# Patient Record
Sex: MALE | Race: WHITE | NOT HISPANIC OR LATINO | Employment: FULL TIME | URBAN - METROPOLITAN AREA
[De-identification: names, ages, dates, MRNs, and addresses within clinical notes are randomized per-mention and may not be internally consistent; named-entity substitution may affect disease eponyms.]

---

## 2019-04-06 ENCOUNTER — APPOINTMENT (EMERGENCY)
Dept: RADIOLOGY | Facility: HOSPITAL | Age: 24
End: 2019-04-06
Payer: COMMERCIAL

## 2019-04-06 ENCOUNTER — HOSPITAL ENCOUNTER (EMERGENCY)
Facility: HOSPITAL | Age: 24
Discharge: HOME | End: 2019-04-06
Attending: EMERGENCY MEDICINE | Admitting: EMERGENCY MEDICINE
Payer: COMMERCIAL

## 2019-04-06 VITALS
RESPIRATION RATE: 18 BRPM | WEIGHT: 145 LBS | SYSTOLIC BLOOD PRESSURE: 103 MMHG | HEART RATE: 78 BPM | TEMPERATURE: 98.6 F | OXYGEN SATURATION: 99 % | DIASTOLIC BLOOD PRESSURE: 58 MMHG

## 2019-04-06 DIAGNOSIS — R07.9 CHEST PAIN: Primary | ICD-10-CM

## 2019-04-06 LAB
ANION GAP SERPL CALCULATED.3IONS-SCNC: 10 MMOL/L (ref 4–13)
BASOPHILS # BLD AUTO: 0.02 THOUSANDS/ΜL (ref 0–0.1)
BASOPHILS NFR BLD AUTO: 0 % (ref 0–1)
BUN SERPL-MCNC: 15 MG/DL (ref 5–25)
CALCIUM SERPL-MCNC: 9.6 MG/DL (ref 8.3–10.1)
CHLORIDE SERPL-SCNC: 102 MMOL/L (ref 100–108)
CO2 SERPL-SCNC: 28 MMOL/L (ref 21–32)
CREAT SERPL-MCNC: 1.13 MG/DL (ref 0.6–1.3)
EOSINOPHIL # BLD AUTO: 0.06 THOUSAND/ΜL (ref 0–0.61)
EOSINOPHIL NFR BLD AUTO: 1 % (ref 0–6)
ERYTHROCYTE [DISTWIDTH] IN BLOOD BY AUTOMATED COUNT: 12.3 % (ref 11.6–15.1)
GFR SERPL CREATININE-BSD FRML MDRD: 92 ML/MIN/1.73SQ M
GLUCOSE SERPL-MCNC: 84 MG/DL (ref 65–140)
HCT VFR BLD AUTO: 40.8 % (ref 36.5–49.3)
HGB BLD-MCNC: 14 G/DL (ref 12–17)
IMM GRANULOCYTES # BLD AUTO: 0.02 THOUSAND/UL (ref 0–0.2)
IMM GRANULOCYTES NFR BLD AUTO: 0 % (ref 0–2)
LYMPHOCYTES # BLD AUTO: 2.14 THOUSANDS/ΜL (ref 0.6–4.47)
LYMPHOCYTES NFR BLD AUTO: 22 % (ref 14–44)
MCH RBC QN AUTO: 29.4 PG (ref 26.8–34.3)
MCHC RBC AUTO-ENTMCNC: 34.3 G/DL (ref 31.4–37.4)
MCV RBC AUTO: 86 FL (ref 82–98)
MONOCYTES # BLD AUTO: 1.02 THOUSAND/ΜL (ref 0.17–1.22)
MONOCYTES NFR BLD AUTO: 10 % (ref 4–12)
NEUTROPHILS # BLD AUTO: 6.57 THOUSANDS/ΜL (ref 1.85–7.62)
NEUTS SEG NFR BLD AUTO: 67 % (ref 43–75)
NRBC BLD AUTO-RTO: 0 /100 WBCS
PLATELET # BLD AUTO: 180 THOUSANDS/UL (ref 149–390)
PMV BLD AUTO: 9.4 FL (ref 8.9–12.7)
POTASSIUM SERPL-SCNC: 3.3 MMOL/L (ref 3.5–5.3)
RBC # BLD AUTO: 4.77 MILLION/UL (ref 3.88–5.62)
SODIUM SERPL-SCNC: 140 MMOL/L (ref 136–145)
TROPONIN I SERPL-MCNC: <0.02 NG/ML
WBC # BLD AUTO: 9.83 THOUSAND/UL (ref 4.31–10.16)

## 2019-04-06 PROCEDURE — 99285 EMERGENCY DEPT VISIT HI MDM: CPT | Performed by: EMERGENCY MEDICINE

## 2019-04-06 PROCEDURE — 80048 BASIC METABOLIC PNL TOTAL CA: CPT | Performed by: EMERGENCY MEDICINE

## 2019-04-06 PROCEDURE — 84484 ASSAY OF TROPONIN QUANT: CPT | Performed by: EMERGENCY MEDICINE

## 2019-04-06 PROCEDURE — 71046 X-RAY EXAM CHEST 2 VIEWS: CPT

## 2019-04-06 PROCEDURE — 93005 ELECTROCARDIOGRAM TRACING: CPT

## 2019-04-06 PROCEDURE — 85025 COMPLETE CBC W/AUTO DIFF WBC: CPT | Performed by: EMERGENCY MEDICINE

## 2019-04-06 PROCEDURE — 36415 COLL VENOUS BLD VENIPUNCTURE: CPT | Performed by: EMERGENCY MEDICINE

## 2019-04-06 PROCEDURE — 99285 EMERGENCY DEPT VISIT HI MDM: CPT

## 2019-04-07 LAB
ATRIAL RATE: 81 BPM
P AXIS: 49 DEGREES
PR INTERVAL: 168 MS
QRS AXIS: 44 DEGREES
QRSD INTERVAL: 90 MS
QT INTERVAL: 346 MS
QTC INTERVAL: 401 MS
T WAVE AXIS: 67 DEGREES
VENTRICULAR RATE: 81 BPM

## 2019-04-07 PROCEDURE — 93010 ELECTROCARDIOGRAM REPORT: CPT | Performed by: INTERNAL MEDICINE

## 2019-05-13 ENCOUNTER — APPOINTMENT (EMERGENCY)
Dept: CT IMAGING | Facility: HOSPITAL | Age: 24
End: 2019-05-13
Payer: COMMERCIAL

## 2019-05-13 ENCOUNTER — APPOINTMENT (EMERGENCY)
Dept: RADIOLOGY | Facility: HOSPITAL | Age: 24
End: 2019-05-13
Payer: COMMERCIAL

## 2019-05-13 ENCOUNTER — HOSPITAL ENCOUNTER (EMERGENCY)
Facility: HOSPITAL | Age: 24
End: 2019-05-13
Attending: EMERGENCY MEDICINE | Admitting: EMERGENCY MEDICINE
Payer: COMMERCIAL

## 2019-05-13 VITALS
RESPIRATION RATE: 18 BRPM | OXYGEN SATURATION: 100 % | DIASTOLIC BLOOD PRESSURE: 61 MMHG | TEMPERATURE: 97.2 F | HEART RATE: 78 BPM | SYSTOLIC BLOOD PRESSURE: 116 MMHG | WEIGHT: 148.37 LBS

## 2019-05-13 DIAGNOSIS — S27.321A CONTUSION OF RIGHT LUNG, INITIAL ENCOUNTER: Primary | ICD-10-CM
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
## 2019-05-13 LAB
ANION GAP SERPL CALCULATED.3IONS-SCNC: 8 MMOL/L (ref 4–13)
BASOPHILS # BLD AUTO: 0.04 THOUSANDS/ΜL (ref 0–0.1)
BASOPHILS NFR BLD AUTO: 1 % (ref 0–1)
BUN SERPL-MCNC: 13 MG/DL (ref 5–25)
CALCIUM SERPL-MCNC: 9.8 MG/DL (ref 8.3–10.1)
CHLORIDE SERPL-SCNC: 103 MMOL/L (ref 100–108)
CO2 SERPL-SCNC: 28 MMOL/L (ref 21–32)
CREAT SERPL-MCNC: 1.13 MG/DL (ref 0.6–1.3)
EOSINOPHIL # BLD AUTO: 0.11 THOUSAND/ΜL (ref 0–0.61)
EOSINOPHIL NFR BLD AUTO: 1 % (ref 0–6)
ERYTHROCYTE [DISTWIDTH] IN BLOOD BY AUTOMATED COUNT: 13.1 % (ref 11.6–15.1)
GFR SERPL CREATININE-BSD FRML MDRD: 92 ML/MIN/1.73SQ M
GLUCOSE SERPL-MCNC: 98 MG/DL (ref 65–140)
HCT VFR BLD AUTO: 46.2 % (ref 36.5–49.3)
HGB BLD-MCNC: 15.4 G/DL (ref 12–17)
IMM GRANULOCYTES # BLD AUTO: 0.07 THOUSAND/UL (ref 0–0.2)
IMM GRANULOCYTES NFR BLD AUTO: 1 % (ref 0–2)
LYMPHOCYTES # BLD AUTO: 2.06 THOUSANDS/ΜL (ref 0.6–4.47)
LYMPHOCYTES NFR BLD AUTO: 25 % (ref 14–44)
MCH RBC QN AUTO: 29.3 PG (ref 26.8–34.3)
MCHC RBC AUTO-ENTMCNC: 33.3 G/DL (ref 31.4–37.4)
MCV RBC AUTO: 88 FL (ref 82–98)
MONOCYTES # BLD AUTO: 0.72 THOUSAND/ΜL (ref 0.17–1.22)
MONOCYTES NFR BLD AUTO: 9 % (ref 4–12)
NEUTROPHILS # BLD AUTO: 5.19 THOUSANDS/ΜL (ref 1.85–7.62)
NEUTS SEG NFR BLD AUTO: 63 % (ref 43–75)
NRBC BLD AUTO-RTO: 0 /100 WBCS
PLATELET # BLD AUTO: 196 THOUSANDS/UL (ref 149–390)
PMV BLD AUTO: 10.2 FL (ref 8.9–12.7)
POTASSIUM SERPL-SCNC: 3.4 MMOL/L (ref 3.5–5.3)
RBC # BLD AUTO: 5.26 MILLION/UL (ref 3.88–5.62)
SODIUM SERPL-SCNC: 139 MMOL/L (ref 136–145)
WBC # BLD AUTO: 8.19 THOUSAND/UL (ref 4.31–10.16)

## 2019-05-13 PROCEDURE — 96374 THER/PROPH/DIAG INJ IV PUSH: CPT

## 2019-05-13 PROCEDURE — 72125 CT NECK SPINE W/O DYE: CPT

## 2019-05-13 PROCEDURE — 36415 COLL VENOUS BLD VENIPUNCTURE: CPT | Performed by: EMERGENCY MEDICINE

## 2019-05-13 PROCEDURE — 73564 X-RAY EXAM KNEE 4 OR MORE: CPT

## 2019-05-13 PROCEDURE — 70450 CT HEAD/BRAIN W/O DYE: CPT

## 2019-05-13 PROCEDURE — 99284 EMERGENCY DEPT VISIT MOD MDM: CPT | Performed by: EMERGENCY MEDICINE

## 2019-05-13 PROCEDURE — 71260 CT THORAX DX C+: CPT

## 2019-05-13 PROCEDURE — 99285 EMERGENCY DEPT VISIT HI MDM: CPT

## 2019-05-13 PROCEDURE — 85025 COMPLETE CBC W/AUTO DIFF WBC: CPT | Performed by: EMERGENCY MEDICINE

## 2019-05-13 PROCEDURE — 74177 CT ABD & PELVIS W/CONTRAST: CPT

## 2019-05-13 PROCEDURE — 80048 BASIC METABOLIC PNL TOTAL CA: CPT | Performed by: EMERGENCY MEDICINE

## 2019-05-13 RX ORDER — KETOROLAC TROMETHAMINE 30 MG/ML
15 INJECTION, SOLUTION INTRAMUSCULAR; INTRAVENOUS ONCE
Status: COMPLETED | OUTPATIENT
Start: 2019-05-13 | End: 2019-05-13

## 2019-05-13 RX ADMIN — KETOROLAC TROMETHAMINE 15 MG: 30 INJECTION, SOLUTION INTRAMUSCULAR; INTRAVENOUS at 21:36

## 2019-05-13 RX ADMIN — IOHEXOL 100 ML: 350 INJECTION, SOLUTION INTRAVENOUS at 20:28

## 2021-10-19 ENCOUNTER — HOSPITAL ENCOUNTER (EMERGENCY)
Facility: HOSPITAL | Age: 26
Discharge: HOME | End: 2021-10-19
Attending: EMERGENCY MEDICINE | Admitting: EMERGENCY MEDICINE
Payer: COMMERCIAL

## 2021-10-19 ENCOUNTER — APPOINTMENT (EMERGENCY)
Dept: CT IMAGING | Facility: HOSPITAL | Age: 26
End: 2021-10-19
Payer: COMMERCIAL

## 2021-10-19 VITALS
RESPIRATION RATE: 18 BRPM | HEART RATE: 79 BPM | TEMPERATURE: 98 F | OXYGEN SATURATION: 99 % | DIASTOLIC BLOOD PRESSURE: 71 MMHG | SYSTOLIC BLOOD PRESSURE: 126 MMHG

## 2021-10-19 DIAGNOSIS — R07.89 ATYPICAL CHEST PAIN: ICD-10-CM
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
## 2021-10-19 DIAGNOSIS — S29.9XXA TRAUMA OF CHEST, INITIAL ENCOUNTER: ICD-10-CM

## 2021-10-19 LAB
ATRIAL RATE: 63 BPM
P AXIS: 60 DEGREES
PR INTERVAL: 158 MS
QRS AXIS: 69 DEGREES
QRSD INTERVAL: 96 MS
QT INTERVAL: 374 MS
QTC INTERVAL: 382 MS
T WAVE AXIS: 61 DEGREES
TROPONIN I SERPL-MCNC: <0.02 NG/ML
VENTRICULAR RATE: 63 BPM

## 2021-10-19 PROCEDURE — 93005 ELECTROCARDIOGRAM TRACING: CPT

## 2021-10-19 PROCEDURE — 84484 ASSAY OF TROPONIN QUANT: CPT | Performed by: EMERGENCY MEDICINE

## 2021-10-19 PROCEDURE — 99285 EMERGENCY DEPT VISIT HI MDM: CPT

## 2021-10-19 PROCEDURE — 36415 COLL VENOUS BLD VENIPUNCTURE: CPT | Performed by: EMERGENCY MEDICINE

## 2021-10-19 PROCEDURE — 93010 ELECTROCARDIOGRAM REPORT: CPT | Performed by: INTERNAL MEDICINE

## 2021-10-19 PROCEDURE — 96374 THER/PROPH/DIAG INJ IV PUSH: CPT

## 2021-10-19 PROCEDURE — 71250 CT THORAX DX C-: CPT

## 2021-10-19 PROCEDURE — 99284 EMERGENCY DEPT VISIT MOD MDM: CPT | Performed by: EMERGENCY MEDICINE

## 2021-10-19 RX ORDER — KETOROLAC TROMETHAMINE 30 MG/ML
15 INJECTION, SOLUTION INTRAMUSCULAR; INTRAVENOUS ONCE
Status: COMPLETED | OUTPATIENT
Start: 2021-10-19 | End: 2021-10-19

## 2021-10-19 RX ORDER — ACETAMINOPHEN 325 MG/1
650 TABLET ORAL ONCE
Status: COMPLETED | OUTPATIENT
Start: 2021-10-19 | End: 2021-10-19

## 2021-10-19 RX ORDER — MECLIZINE HCL 12.5 MG/1
25 TABLET ORAL ONCE
Status: COMPLETED | OUTPATIENT
Start: 2021-10-19 | End: 2021-10-19

## 2021-10-19 RX ADMIN — KETOROLAC TROMETHAMINE 15 MG: 30 INJECTION, SOLUTION INTRAMUSCULAR at 10:14

## 2021-10-19 RX ADMIN — ACETAMINOPHEN 650 MG: 325 TABLET, FILM COATED ORAL at 10:13

## 2021-10-19 RX ADMIN — MECLIZINE 25 MG: 12.5 TABLET ORAL at 11:10

## 2022-11-29 ENCOUNTER — APPOINTMENT (EMERGENCY)
Dept: RADIOLOGY | Facility: HOSPITAL | Age: 27
End: 2022-11-29

## 2022-11-29 ENCOUNTER — HOSPITAL ENCOUNTER (EMERGENCY)
Facility: HOSPITAL | Age: 27
Discharge: HOME | End: 2022-11-29
Attending: EMERGENCY MEDICINE

## 2022-11-29 VITALS
HEART RATE: 68 BPM | RESPIRATION RATE: 16 BRPM | TEMPERATURE: 97.8 F | DIASTOLIC BLOOD PRESSURE: 82 MMHG | SYSTOLIC BLOOD PRESSURE: 124 MMHG | OXYGEN SATURATION: 98 %

## 2022-11-29 DIAGNOSIS — R00.2 PALPITATIONS: Primary | ICD-10-CM

## 2022-11-29 LAB
ALBUMIN SERPL BCP-MCNC: 3.9 G/DL (ref 3.5–5)
ALP SERPL-CCNC: 67 U/L (ref 46–116)
ALT SERPL W P-5'-P-CCNC: 21 U/L (ref 12–78)
ANION GAP SERPL CALCULATED.3IONS-SCNC: 7 MMOL/L (ref 4–13)
APTT PPP: 26 SECONDS (ref 23–37)
AST SERPL W P-5'-P-CCNC: 19 U/L (ref 5–45)
ATRIAL RATE: 69 BPM
BASOPHILS # BLD AUTO: 0.04 THOUSANDS/ÂΜL (ref 0–0.1)
BASOPHILS NFR BLD AUTO: 1 % (ref 0–1)
BILIRUB SERPL-MCNC: 0.29 MG/DL (ref 0.2–1)
BILIRUB UR QL STRIP: NEGATIVE
BUN SERPL-MCNC: 13 MG/DL (ref 5–25)
CALCIUM SERPL-MCNC: 8.6 MG/DL (ref 8.3–10.1)
CARDIAC TROPONIN I PNL SERPL HS: <2 NG/L
CHLORIDE SERPL-SCNC: 103 MMOL/L (ref 96–108)
CLARITY UR: CLEAR
CO2 SERPL-SCNC: 28 MMOL/L (ref 21–32)
COLOR UR: YELLOW
CREAT SERPL-MCNC: 0.94 MG/DL (ref 0.6–1.3)
EOSINOPHIL # BLD AUTO: 0.15 THOUSAND/ÂΜL (ref 0–0.61)
EOSINOPHIL NFR BLD AUTO: 2 % (ref 0–6)
ERYTHROCYTE [DISTWIDTH] IN BLOOD BY AUTOMATED COUNT: 12.7 % (ref 11.6–15.1)
GFR SERPL CREATININE-BSD FRML MDRD: 112 ML/MIN/1.73SQ M
GLUCOSE SERPL-MCNC: 96 MG/DL (ref 65–140)
GLUCOSE UR STRIP-MCNC: NEGATIVE MG/DL
HCT VFR BLD AUTO: 44.4 % (ref 36.5–49.3)
HGB BLD-MCNC: 15.2 G/DL (ref 12–17)
HGB UR QL STRIP.AUTO: NEGATIVE
IMM GRANULOCYTES # BLD AUTO: 0.03 THOUSAND/UL (ref 0–0.2)
IMM GRANULOCYTES NFR BLD AUTO: 0 % (ref 0–2)
INR PPP: 1.12 (ref 0.84–1.19)
KETONES UR STRIP-MCNC: NEGATIVE MG/DL
LEUKOCYTE ESTERASE UR QL STRIP: NEGATIVE
LYMPHOCYTES # BLD AUTO: 1.54 THOUSANDS/ÂΜL (ref 0.6–4.47)
LYMPHOCYTES NFR BLD AUTO: 22 % (ref 14–44)
MAGNESIUM SERPL-MCNC: 2.2 MG/DL (ref 1.6–2.6)
MCH RBC QN AUTO: 28.6 PG (ref 26.8–34.3)
MCHC RBC AUTO-ENTMCNC: 34.2 G/DL (ref 31.4–37.4)
MCV RBC AUTO: 84 FL (ref 82–98)
MONOCYTES # BLD AUTO: 1.1 THOUSAND/ÂΜL (ref 0.17–1.22)
MONOCYTES NFR BLD AUTO: 15 % (ref 4–12)
NEUTROPHILS # BLD AUTO: 4.29 THOUSANDS/ÂΜL (ref 1.85–7.62)
NEUTS SEG NFR BLD AUTO: 60 % (ref 43–75)
NITRITE UR QL STRIP: NEGATIVE
NRBC BLD AUTO-RTO: 0 /100 WBCS
NT-PROBNP SERPL-MCNC: 12 PG/ML
P AXIS: 30 DEGREES
PH UR STRIP.AUTO: 7 [PH] (ref 4.5–8)
PLATELET # BLD AUTO: 204 THOUSANDS/UL (ref 149–390)
PMV BLD AUTO: 9.5 FL (ref 8.9–12.7)
POTASSIUM SERPL-SCNC: 3.7 MMOL/L (ref 3.5–5.3)
PR INTERVAL: 148 MS
PROT SERPL-MCNC: 7.3 G/DL (ref 6.4–8.4)
PROT UR STRIP-MCNC: NEGATIVE MG/DL
PROTHROMBIN TIME: 14.4 SECONDS (ref 11.6–14.5)
QRS AXIS: 71 DEGREES
QRSD INTERVAL: 98 MS
QT INTERVAL: 370 MS
QTC INTERVAL: 396 MS
RBC # BLD AUTO: 5.32 MILLION/UL (ref 3.88–5.62)
SODIUM SERPL-SCNC: 138 MMOL/L (ref 135–147)
SP GR UR STRIP.AUTO: 1.02 (ref 1–1.03)
T WAVE AXIS: 67 DEGREES
TSH SERPL DL<=0.05 MIU/L-ACNC: 0.72 UIU/ML (ref 0.45–4.5)
UROBILINOGEN UR QL STRIP.AUTO: 0.2 E.U./DL
VENTRICULAR RATE: 69 BPM
WBC # BLD AUTO: 7.15 THOUSAND/UL (ref 4.31–10.16)

## 2022-11-29 RX ORDER — MAGNESIUM HYDROXIDE/ALUMINUM HYDROXICE/SIMETHICONE 120; 1200; 1200 MG/30ML; MG/30ML; MG/30ML
30 SUSPENSION ORAL ONCE
Status: COMPLETED | OUTPATIENT
Start: 2022-11-29 | End: 2022-11-29

## 2022-11-29 RX ORDER — SUCRALFATE 1 G/1
1 TABLET ORAL ONCE
Status: COMPLETED | OUTPATIENT
Start: 2022-11-29 | End: 2022-11-29

## 2022-11-29 RX ADMIN — SUCRALFATE 1 G: 1 TABLET ORAL at 20:09

## 2022-11-29 RX ADMIN — ALUMINUM HYDROXIDE, MAGNESIUM HYDROXIDE, AND SIMETHICONE 30 ML: 200; 200; 20 SUSPENSION ORAL at 20:09

## 2022-11-30 NOTE — ED PROVIDER NOTES
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  Pt reports "compressed" feeling in chest       Patient is a 43-year-old male otherwise healthy coming in today complaining of chest pain or palpitations  He states that initially started yesterday and is been intermittent in nature  He states that today he was at friends giving, he was doing dishes when he felt like his heart was racing fast, some chest discomfort and felt dizzy and thought he was going to pass out  He sat down and felt better  This is been intermittent  He has no recent travel, sick contacts, fevers, chills, nausea vomiting diarrhea  Tolerating p o  Well  Good urine output  He states that he “was told he had a murmur as a baby”  He is not take antibiotics prior to dentist   No heart surgeries  Patient concerning that he drinks too many in a drinks a day  He drinks minimal 3 energy drinks in combination coffee a day        History provided by:  Patient  Palpitations  Palpitations quality:  Fast  Onset quality:  Sudden  Timing:  Intermittent  Progression:  Waxing and waning  Chronicity:  Recurrent  Context: stimulant use    Context: not anxiety, not appetite suppressants, not blood loss, not bronchodilators, not caffeine, not dehydration, not exercise, not hyperventilation, not illicit drugs and not nicotine    Relieved by:  Nothing  Worsened by:  Nothing  Ineffective treatments:  None tried  Associated symptoms: chest pain, dizziness and near-syncope    Associated symptoms: no back pain, no chest pressure, no cough, no diaphoresis, no hemoptysis, no leg pain, no lower extremity edema, no malaise/fatigue, no nausea, no numbness, no orthopnea, no PND, no shortness of breath, no syncope, no vomiting and no weakness    Risk factors: no diabetes mellitus, no heart disease, no hx of atrial fibrillation, no hx of DVT, no hx of PE, no hx of thyroid disease, no hypercoagulable state, no hyperthyroidism, no OTC sinus medications and no stress        None       Past Medical History:   Diagnosis Date   • ADHD    • Murmur        Past Surgical History:   Procedure Laterality Date   • NO PAST SURGERIES         History reviewed  No pertinent family history  I have reviewed and agree with the history as documented  E-Cigarette/Vaping   • E-Cigarette Use Never User      E-Cigarette/Vaping Substances     Social History     Tobacco Use   • Smoking status: Every Day     Packs/day: 0 50     Types: Cigarettes   • Smokeless tobacco: Never   Vaping Use   • Vaping Use: Never used   Substance Use Topics   • Alcohol use: No   • Drug use: Not Currently     Types: Marijuana       Review of Systems   Constitutional: Negative  Negative for chills, diaphoresis, fever and malaise/fatigue  HENT: Negative  Negative for ear pain and sore throat  Eyes: Negative for pain and visual disturbance  Respiratory: Negative  Negative for cough, hemoptysis and shortness of breath  Cardiovascular: Positive for chest pain and near-syncope  Negative for palpitations, orthopnea, syncope and PND  Gastrointestinal: Negative  Negative for abdominal pain, nausea and vomiting  Genitourinary: Negative  Negative for dysuria and hematuria  Musculoskeletal: Negative  Negative for arthralgias and back pain  Skin: Negative  Negative for color change and rash  Neurological: Positive for dizziness  Negative for seizures, syncope, weakness and numbness  Hematological: Negative  Psychiatric/Behavioral: Negative  All other systems reviewed and are negative  Physical Exam  Physical Exam  Vitals and nursing note reviewed  Constitutional:       General: He is not in acute distress  Appearance: He is well-developed  HENT:      Head: Normocephalic and atraumatic        Comments: Patient maintaining airway and secretions  No stridor   No brawniness under tongue  Eyes:      Extraocular Movements: Extraocular movements intact  Conjunctiva/sclera: Conjunctivae normal       Pupils: Pupils are equal, round, and reactive to light  Cardiovascular:      Rate and Rhythm: Normal rate and regular rhythm  Pulses:           Radial pulses are 2+ on the right side and 2+ on the left side  Dorsalis pedis pulses are 2+ on the right side and 2+ on the left side  Heart sounds: Normal heart sounds, S1 normal and S2 normal  No murmur heard  Pulmonary:      Effort: Pulmonary effort is normal  No respiratory distress  Breath sounds: Normal breath sounds  Abdominal:      Palpations: Abdomen is soft  Tenderness: There is no abdominal tenderness  Musculoskeletal:         General: No swelling  Normal range of motion  Cervical back: Neck supple  Right lower leg: No edema  Left lower leg: No edema  Skin:     General: Skin is warm and dry  Capillary Refill: Capillary refill takes less than 2 seconds  Neurological:      General: No focal deficit present  Mental Status: He is alert and oriented to person, place, and time  GCS: GCS eye subscore is 4  GCS verbal subscore is 5  GCS motor subscore is 6  Cranial Nerves: Cranial nerves 2-12 are intact  Sensory: Sensation is intact  Motor: Motor function is intact  Coordination: Coordination is intact  Gait: Gait is intact  Psychiatric:         Mood and Affect: Mood normal          Behavior: Behavior normal          Thought Content:  Thought content normal          Judgment: Judgment normal          Vital Signs  ED Triage Vitals [11/29/22 1900]   Temperature Pulse Respirations Blood Pressure SpO2   97 8 °F (36 6 °C) 79 18 120/66 97 %      Temp Source Heart Rate Source Patient Position - Orthostatic VS BP Location FiO2 (%)   Oral Monitor Sitting Right arm --      Pain Score       --           Vitals: 11/29/22 1900 11/29/22 2110   BP: 120/66 124/82   Pulse: 79 68   Patient Position - Orthostatic VS: Sitting Lying         Visual Acuity      ED Medications  Medications   aluminum-magnesium hydroxide-simethicone (MYLANTA) oral suspension 30 mL (30 mL Oral Given 11/29/22 2009)   sucralfate (CARAFATE) tablet 1 g (1 g Oral Given 11/29/22 2009)       Diagnostic Studies  Results Reviewed     Procedure Component Value Units Date/Time    TSH [[id number]]  (Normal) Collected: 11/29/22 2004    Lab Status: Final result Specimen: Blood from Vein Updated: 11/29/22 2105     TSH 3RD GENERATON 0 722 uIU/mL     Narrative:      Patients undergoing fluorescein dye angiography may retain small amounts of fluorescein in the body for 48-72 hours post procedure  Samples containing fluorescein can produce falsely depressed TSH values  If the patient had this procedure,a specimen should be resubmitted post fluorescein clearance        NT-BNP PRO [[id number]]  (Normal) Collected: 11/29/22 2004    Lab Status: Final result Specimen: Blood from Vein Updated: 11/29/22 2105     NT-proBNP 12 pg/mL     Magnesium [[id number]]  (Normal) Collected: 11/29/22 2004    Lab Status: Final result Specimen: Blood from Vein Updated: 11/29/22 2105     Magnesium 2 2 mg/dL     HS Troponin 0hr (reflex protocol) [[id number]]  (Normal) Collected: 11/29/22 2004    Lab Status: Final result Specimen: Blood from Vein Updated: 11/29/22 2100     hs TnI 0hr <2 ng/L     Comprehensive metabolic panel [[id number]] Collected: 11/29/22 2004    Lab Status: Final result Specimen: Blood from Vein Updated: 11/29/22 2055     Sodium 138 mmol/L      Potassium 3 7 mmol/L      Chloride 103 mmol/L      CO2 28 mmol/L      ANION GAP 7 mmol/L      BUN 13 mg/dL      Creatinine 0 94 mg/dL      Glucose 96 mg/dL      Calcium 8 6 mg/dL      AST 19 U/L      ALT 21 U/L      Alkaline Phosphatase 67 U/L      Total Protein 7 3 g/dL      Albumin 3 9 g/dL      Total Bilirubin 0 29 mg/dL      eGFR 112 ml/min/1 73sq m     Narrative:      [employer] guidelines for Chronic Kidney Disease (CKD):   •  Stage 1 with normal or high GFR (GFR > 90 mL/min/1 73 square meters)  •  Stage 2 Mild CKD (GFR = 60-89 mL/min/1 73 square meters)  •  Stage 3A Moderate CKD (GFR = 45-59 mL/min/1 73 square meters)  •  Stage 3B Moderate CKD (GFR = 30-44 mL/min/1 73 square meters)  •  Stage 4 Severe CKD (GFR = 15-29 mL/min/1 73 square meters)  •  Stage 5 End Stage CKD (GFR <15 mL/min/1 73 square meters)  Note: GFR calculation is accurate only with a steady state creatinine    Protime-INR [[id number]]  (Normal) Collected: 11/29/22 2004    Lab Status: Final result Specimen: Blood from Vein Updated: 11/29/22 2041     Protime 14 4 seconds      INR 1 12    APTT [[id number]]  (Normal) Collected: 11/29/22 2004    Lab Status: Final result Specimen: Blood from Vein Updated: 11/29/22 2041     PTT 26 seconds     CBC and differential [[id number]]  (Abnormal) Collected: 11/29/22 2004    Lab Status: Final result Specimen: Blood from Vein Updated: 11/29/22 2021     WBC 7 15 Thousand/uL      RBC 5 32 Million/uL      Hemoglobin 15 2 g/dL      Hematocrit 44 4 %      MCV 84 fL      MCH 28 6 pg      MCHC 34 2 g/dL      RDW 12 7 %      MPV 9 5 fL      Platelets 777 Thousands/uL      nRBC 0 /100 WBCs      Neutrophils Relative 60 %      Immat GRANS % 0 %      Lymphocytes Relative 22 %      Monocytes Relative 15 %      Eosinophils Relative 2 %      Basophils Relative 1 %      Neutrophils Absolute 4 29 Thousands/µL      Immature Grans Absolute 0 03 Thousand/uL      Lymphocytes Absolute 1 54 Thousands/µL      Monocytes Absolute 1 10 Thousand/µL      Eosinophils Absolute 0 15 Thousand/µL      Basophils Absolute 0 04 Thousands/µL     Urine Macroscopic, POC [[id number]] Collected: 11/29/22 1957    Lab Status: Final result Specimen: Urine Updated: 11/29/22 1959     Color, UA Yellow     Clarity, UA Clear     pH, UA 7 0     Leukocytes, UA Negative Nitrite, UA Negative     Protein, UA Negative mg/dl      Glucose, UA Negative mg/dl      Ketones, UA Negative mg/dl      Urobilinogen, UA 0 2 E U /dl      Bilirubin, UA Negative     Occult Blood, UA Negative     Specific Gravity, UA 1 020    Narrative:      [facility] RESULT                 XR chest 1 view portable    (Results Pending)              Procedures  Procedures         ED Course  ED Course as of 11/29/22 2124 Tue Nov 29, 2022 1947 Patient is a 81-year-old male coming in with onset of chest pain and palpitations at intermittent for the past 2 days  On exam well appearing in no acute distress  Negative perc and low wells therefore no D-dimer  Will start cardiac workup      Disclosure: Voice to text software was used in the preparation of this document and could have resulted in translational errors       Occasional wrong word or "sound a like" substitutions may have occurred due to the inherent limitations of voice recognition software   Read the chart carefully and recognize, using context, where substitutions have occurred         2028 Chest x-ray clear  CBC stable  EKG without ischemic changes  before I could re-evaluate patient patient wanting to leave  He states that he cannot stay any longer  He was hoping to get out less than an hour  On my exam patient's labs are stable no evidence of end-organ damage or septicemia  Heart score less than 3  Patient had negative troponin          HEART Risk Score    Flowsheet Row Most Recent Value   Heart Score Risk Calculator    History 0 Filed at: 11/29/2022 2124   ECG 0 Filed at: 11/29/2022 2124   Age 0 Filed at: 11/29/2022 2124   Risk Factors 1 Filed at: 11/29/2022 2124   Troponin 0 Filed at: 11/29/2022 2124   HEART Score 1 Filed at: 11/29/2022 2124                        SBIRT 20yo+    Flowsheet Row Most Recent Value   SBIRT (23 yo +)    In order to provide better care to our patients, we are screening all of our patients for alcohol and drug use  Would it be okay to ask you these screening questions? No Filed at: 11/29/2022 2020                    [facility]  Number of Diagnoses or Management Options  Diagnosis management comments:     EKG INTERPRETATION @ 7586  RHYTHM:NSR @ 70 bpm  AXIS: normal axis  INTERVALS: OK measured at 148 ms  QRS COMPLEX: QRS measured at 98 ms  Incomplete RBBB  ST SEGMENT: non specific st segment changes  Diffuse artifact  QT INTERVAL: QTC @ 396 ms  COMPARED WITH PRIOR no acute change from 10/21  Interpretation by Alex Leonardo DO    Differential diagnosis includes but not limited to:  Cardiac arrhythmia is, toxicologic, hypothyroidism, hyperthyroidism, anemia, hyponatremia, hypernatremia, hypomagnesemia, hypermagnesemia, , hypokalemia, hypokalemia, acute kidney injury anxiety, depression, cardiomyopathy, alcohol abuse, tobacco abuse PVCs, prescription medications, pericarditis, myocarditis, septal defect, QTC prolongation, pregnancy, infectious, CO, pheochromocytoma         Amount and/or Complexity of Data Reviewed  Clinical lab tests: ordered and reviewed  Tests in the radiology section of CPT®: reviewed and ordered  Tests in the medicine section of CPT®: ordered and reviewed  Review and summarize past medical records: yes  Independent visualization of images, tracings, or specimens: yes        Disposition  Final diagnoses:   None     ED Disposition     ED Disposition   Left from Room after Provider Exam    Condition   --    Date/Time   Tue Nov 29, 2022  9:15 PM    Comment   --         Follow-up Information    None         There are no discharge medications for this patient  No discharge procedures on file      PDMP Review     None          ED Provider  Electronically Signed by           Adis Silva DO  11/29/22 0193

## 2022-11-30 NOTE — ED NOTES
Pt requesting to go home  Pt reporting cannot stay due to fussy baby  Pt reports baby in car with girlfriend  Made pt aware he should stay for completion of care  Dr Onur Lugo made aware pt wants to leave  IV removed and pt ambulated of dept   Pt in [facility]     Sai Goodson RN  11/29/22 5567

## 2024-03-11 ENCOUNTER — HOSPITAL ENCOUNTER (EMERGENCY)
Facility: HOSPITAL | Age: 29
Discharge: HOME | End: 2024-03-11
Attending: EMERGENCY MEDICINE

## 2024-03-11 ENCOUNTER — TELEPHONE (OUTPATIENT)
Age: 29
End: 2024-03-11

## 2024-03-11 ENCOUNTER — APPOINTMENT (EMERGENCY)
Dept: RADIOLOGY | Facility: HOSPITAL | Age: 29
End: 2024-03-11

## 2024-03-11 VITALS
TEMPERATURE: 98.4 F | SYSTOLIC BLOOD PRESSURE: 119 MMHG | DIASTOLIC BLOOD PRESSURE: 73 MMHG | HEART RATE: 74 BPM | RESPIRATION RATE: 17 BRPM | WEIGHT: 166.67 LBS | OXYGEN SATURATION: 98 %

## 2024-03-11 DIAGNOSIS — S60.221A CONTUSION OF RIGHT HAND, INITIAL ENCOUNTER: Primary | ICD-10-CM

## 2024-03-11 PROCEDURE — 29130 APPL FINGER SPLINT STATIC: CPT | Performed by: PHYSICIAN ASSISTANT

## 2024-03-11 PROCEDURE — 99284 EMERGENCY DEPT VISIT MOD MDM: CPT | Performed by: PHYSICIAN ASSISTANT

## 2024-03-11 PROCEDURE — 73110 X-RAY EXAM OF WRIST: CPT

## 2024-03-11 PROCEDURE — 99283 EMERGENCY DEPT VISIT LOW MDM: CPT

## 2024-03-11 PROCEDURE — 73130 X-RAY EXAM OF HAND: CPT

## 2024-03-11 RX ORDER — ACETAMINOPHEN 325 MG/1
650 TABLET ORAL ONCE
Status: COMPLETED | OUTPATIENT
Start: 2024-03-11 | End: 2024-03-11

## 2024-03-11 RX ORDER — IBUPROFEN 600 MG/1
600 TABLET ORAL ONCE
Status: COMPLETED | OUTPATIENT
Start: 2024-03-11 | End: 2024-03-11

## 2024-03-11 RX ADMIN — ACETAMINOPHEN 325MG 650 MG: 325 TABLET ORAL at 08:58

## 2024-03-11 RX ADMIN — IBUPROFEN 600 MG: 600 TABLET ORAL at 08:58

## 2024-03-11 NOTE — ED PROVIDER NOTES
History  Chief Complaint   Patient presents with    Hand Swelling     Pt c/o right hand swelling after py struck a mechanical punching bag x 2 days ago. Pt denies any other medical concerns     28y.o male with PMH of ADHD presents to the ER for right hand pain and swelling for 2 days. Patient states he was at [employer] when he was punching a punching bag and hurt his hand. He has tried taking pain medication without relief. He describes his pain as sharp and radiating down his arm. Pain is constant. He is right hand dominant. He denies fever, chills, URI symptoms, chest pain, dyspnea, N/V/D, abdominal pain or weakness.      History provided by:  Patient   used: No        None       Past Medical History:   Diagnosis Date    ADHD     Murmur        Past Surgical History:   Procedure Laterality Date    NO PAST SURGERIES         History reviewed. No pertinent family history.  I have reviewed and agree with the history as documented.    E-Cigarette/Vaping    E-Cigarette Use Current Every Day User      E-Cigarette/Vaping Substances     Social History     Tobacco Use    Smoking status: Former     Current packs/day: 0.50     Types: Cigarettes    Smokeless tobacco: Never   Vaping Use    Vaping status: Every Day   Substance Use Topics    Alcohol use: No    Drug use: Yes     Types: Marijuana       Review of Systems   Constitutional:  Negative for activity change, appetite change, chills and fever.   HENT:  Negative for congestion, drooling, ear discharge, ear pain, facial swelling, rhinorrhea and sore throat.    Eyes:  Negative for redness.   Respiratory:  Negative for cough and shortness of breath.    Cardiovascular:  Negative for chest pain.   Gastrointestinal:  Negative for abdominal pain, diarrhea, nausea and vomiting.   Musculoskeletal:  Positive for joint swelling (right hand). Negative for neck stiffness.   Skin:  Negative for rash.   Allergic/Immunologic: Negative for food allergies.   Neurological:   Negative for weakness and numbness.       Physical Exam  Physical Exam  Vitals and nursing note reviewed.   Constitutional:       General: He is not in acute distress.     Appearance: He is not toxic-appearing.   HENT:      Head: Normocephalic and atraumatic.      Mouth/Throat:      Lips: Pink. No lesions.   Eyes:      Conjunctiva/sclera: Conjunctivae normal.   Neck:      Trachea: No tracheal deviation.   Cardiovascular:      Rate and Rhythm: Normal rate.   Pulmonary:      Effort: Pulmonary effort is normal. No respiratory distress.   Abdominal:      General: There is no distension.   Musculoskeletal:      Right elbow: Normal.      Right forearm: Normal.      Right wrist: Tenderness, bony tenderness and snuff box tenderness present. No swelling, deformity or crepitus. Decreased range of motion. Normal pulse.      Right hand: Swelling, tenderness and bony tenderness present. No deformity or lacerations. Decreased range of motion. Normal strength. Normal sensation. Normal pulse.        Hands:       Cervical back: Normal range of motion and neck supple.   Skin:     General: Skin is warm and dry.      Findings: No rash.   Neurological:      Mental Status: He is alert.      GCS: GCS eye subscore is 4. GCS verbal subscore is 5. GCS motor subscore is 6.   Psychiatric:         Mood and Affect: Mood normal.         Vital Signs  ED Triage Vitals [03/11/24 0822]   Temperature Pulse Respirations Blood Pressure SpO2   98.4 °F (36.9 °C) 74 17 119/73 98 %      Temp Source Heart Rate Source Patient Position - Orthostatic VS BP Location FiO2 (%)   Oral Monitor Sitting Right arm --      Pain Score       8           Vitals:    03/11/24 0822   BP: 119/73   Pulse: 74   Patient Position - Orthostatic VS: Sitting         Visual Acuity      ED Medications  Medications   acetaminophen (TYLENOL) tablet 650 mg (650 mg Oral Given 3/11/24 0858)   ibuprofen (MOTRIN) tablet 600 mg (600 mg Oral Given 3/11/24 0858)       Diagnostic Studies  Results  Reviewed       None                   XR hand 3+ views RIGHT   ED Interpretation by Anabel Nicolas PA-C (03/11 1030)   No acute abnormalities seen by me at this time.      Final Result by Gunjan Whitaker MD (03/11 1018)      No acute osseous abnormality.      Workstation performed: [id number]         XR wrist 3+ views RIGHT   ED Interpretation by Anabel Nicolas PA-C (03/11 1017)   No acute abnormalities seen by me at this time.      Final Result by Gunjan Whitaker MD (03/11 1015)      No acute osseous abnormality.            Workstation performed: [id number]                    Procedures  Orthopedic injury treatment    Date/Time: 3/11/2024 10:35 AM    Performed by: Anabel Nicolas PA-C  Authorized by: Anabel Nicolas PA-C    Patient Location:  ED  Pep Protocol:  Procedure performed by: (ED technician)  Consent: Verbal consent obtained.  Consent given by: patient  Patient understanding: patient states understanding of the procedure being performed  Radiology Images displayed and confirmed. If images not available, report reviewed: imaging studies available  Patient identity confirmed: arm band    Injury location:  Hand  Location details:  Right hand  Injury type:  Soft tissue  Neurovascular status: Neurovascularly intact    Distal perfusion: normal    Neurological function: normal    Range of motion: reduced    Local anesthesia used?: No    General anesthesia used?: No    Skeletal traction used?: No    Immobilization:  Splint  Splint type:  Thumb spica  Supplies used:  Cotton padding, elastic bandage and Ortho-Glass  Neurovascular status: Neurovascularly intact    Distal perfusion: normal    Neurological function: normal    Range of motion: unchanged    Patient tolerance:  Patient tolerated the procedure well with no immediate complications           ED Course                               SBIRT 20yo+      Flowsheet Row Most Recent Value   Initial Alcohol Screen: US AUDIT-C    "  1. How often do you have a drink containing alcohol? 1 Filed at: 03/11/2024 0839   2. How many drinks containing alcohol do you have on a typical day you are drinking?  0 Filed at: 03/11/2024 0839   3a. Male UNDER 65: How often do you have five or more drinks on one occasion? 0 Filed at: 03/11/2024 0839   Audit-C Score 1 Filed at: 03/11/2024 0839   YULISSA: How many times in the past year have you...    Used an illegal drug or used a prescription medication for non-medical reasons? Weekly Filed at: 03/11/2024 0839   DAST-10: In the past 12 months...    1. Have you used drugs other than those required for medical reasons? 0 Filed at: 03/11/2024 0839   2. Do you use more than one drug at a time? 0 Filed at: 03/11/2024 0839   3. Have you had medical problems as a result of your drug use (e.g., memory loss, hepatitis, convulsions, bleeding, etc.)? 0 Filed at: 03/11/2024 0839   4. Have you had \"blackouts\" or \"flashbacks\" as a result of drug use?YesNo 0 Filed at: 03/11/2024 0839   5. Do you ever feel bad or guilty about your drug use? 0 Filed at: 03/11/2024 0839   6. Does your spouse (or parent) ever complain about your involvement with drugs? 0 Filed at: 03/11/2024 0839   7. Have you neglected your family because of your use of drugs? 0 Filed at: 03/11/2024 0839   8. Have you engaged in illegal activities in order to obtain drugs? 0 Filed at: 03/11/2024 0839   9. Have you ever experienced withdrawal symptoms (felt sick) when you stopped taking drugs? 0 Filed at: 03/11/2024 0839   10. Are you always able to stop using drugs when you want to? 0 Filed at: 03/11/2024 0839   DAST-10 Score 0 Filed at: 03/11/2024 0839                      Medical Decision Making  28y.o male presents to the ER for right hand pain and swelling for 2 days. Vitals are stable. Patient is in no acute distress. On exam, breathing is non-labored. No tachypnea or accessory muscle use. Heart is regular rate. Abdomen is not distended. Moderate swelling " seen. No erythema or deformity. Area is tender to palpation extending into thumb and wrist. Decreased ROM of fingers and wrist due to pain. Neurovascularly intact. Will check imaging.    1030 - Informed patient I did not see any acute abnormalities on xray at this time and if the radiologist saw anything concerning when reading the xray, we would call to inform them. Patient agreeable. Will place in splint and give orthopedic follow up.    The management plan was discussed in detail with the patient at bedside and all questions were answered.  Prior to discharge, we provided both verbal and written instructions.  We discussed with the patient the signs and symptoms for which to return to the emergency department.  All questions were answered and patient was comfortable with the plan of care and discharged to home.  Instructed the patient to follow up with the primary care provider and/or specialist provided and their written instructions.  The patient verbalized understanding of our discussion and plan of care, and agrees to return to the Emergency Department for concerns and progression of illness.    At discharge, I instructed the patient to:  -follow up with pcp  -take Tylenol or Motrin for pain  -rest, ice and elevate the hand  -wear splint  -follow up with hand specialist  -return to the ER if symptoms worsened or new symptoms arose  Patient agreed to this plan and was stable at time of discharge.     Problems Addressed:  Contusion of right hand, initial encounter: acute illness or injury    Amount and/or Complexity of Data Reviewed  Independent Historian:      Details: Patient is historian  Radiology: ordered and independent interpretation performed.    Risk  OTC drugs.  Prescription drug management.             Disposition  Final diagnoses:   Contusion of right hand, initial encounter     Time reflects when diagnosis was documented in both MDM as applicable and the Disposition within this note       Time User  Action Codes Description Comment    3/11/2024 10:32 AM Anabel Nicolas Add [S60.221A] Contusion of right hand, initial encounter           ED Disposition       ED Disposition   Discharge    Condition   Stable    Date/Time   Mon Mar 11, 2024 1014    Comment   Jose J Rodriguez discharge to home/self care.                   Follow-up Information       Follow up With Specialties Details Why Contact Info    Gerson Hanley MD Family Medicine Schedule an appointment as soon as possible for a visit   [address] [facility]   [facility] [address]  [phone]      Holden Eid MD Orthopedic Surgery, Hand Surgery Schedule an appointment as soon as possible for a visit   [address] [facility] [address] [phone]              There are no discharge medications for this patient.      No discharge procedures on file.    PDMP Review       None            ED Provider  Electronically Signed by             Anabel Nicolas PA-C  03/11/24 2124

## 2024-03-11 NOTE — DISCHARGE INSTRUCTIONS
DISCHARGE INSTRUCTIONS:    FOLLOW UP WITH YOUR PRIMARY CARE PROVIDER OR THE RECOMMENDED HEALTH CLINIC. MAKE AN APPOINTMENT TO BE SEEN.     TAKE TYLENOL OR MOTRIN FOR PAIN.    FOLLOW UP WITH THE RECOMMENDED ORTHOPEDIC SPECIALIST. MAKE AN APPOINTMENT TO BE SEEN.    REST, ICE AND ELEVATE THE AREA.    WEAR THE SPLINT FOR COMFORT.    IF SYMPTOMS WORSEN OR NEW SYMPTOMS ARISE, RETURN TO THE ER TO BE SEEN.

## 2024-03-11 NOTE — TELEPHONE ENCOUNTER
Hello,  Please advise if the following patient can be forced onto the schedule:    Patient: Jose J Rodriguez    : [birth year]    MRN: [id number]    Call back #: [phone] Suzie (aunt)    Insurance: Self-pay    Reason for appointment: right hand injury, SL xray    Requested doctor/location: sheyla Eidt is requesting the doctor       Thank you.

## 2024-03-11 NOTE — TELEPHONE ENCOUNTER
3/26/2018     Loretta Lopez   [address] [address]        To Whom This May Concern,      Loretta Lopez  was seen today at our office. Please excuse Loretta Lopez  from work from 3/24/2018 to 3/28/2018 health related reasons.    Return to work 3/29/2018 - work restriction - no lifting more than 10 lbs. Light duty.     Re-evaluation on 4/6/2018.     If there are any questions, please contact our clinic.          Sincerely,          Jermaine Chowdary MD  Family Medicine  [address] [place] [address]  [phone]             Caller: Suzie (Aunt)      Reason for call: Mom is in [place] and his aunt is taking care of him so if you try to get a hold of mom you can not. Patient was with the aunt with last phone call.  She is not on emergency contact.    Please see below concerning appointment.     Call back#: [phone]

## 2024-03-15 ENCOUNTER — OFFICE VISIT (OUTPATIENT)
Dept: OBGYN CLINIC | Facility: CLINIC | Age: 29
End: 2024-03-15

## 2024-03-15 VITALS — WEIGHT: 166 LBS | DIASTOLIC BLOOD PRESSURE: 78 MMHG | SYSTOLIC BLOOD PRESSURE: 128 MMHG

## 2024-03-15 DIAGNOSIS — S63.501A SPRAIN OF RIGHT WRIST, INITIAL ENCOUNTER: Primary | ICD-10-CM

## 2024-03-15 PROCEDURE — 99203 OFFICE O/P NEW LOW 30 MIN: CPT | Performed by: PHYSICIAN ASSISTANT

## 2024-03-15 RX ORDER — MELOXICAM 15 MG/1
15 TABLET ORAL DAILY
Qty: 30 TABLET | Refills: 0 | Status: SHIPPED | OUTPATIENT
Start: 2024-03-15

## 2024-03-15 NOTE — PROGRESS NOTES
Patient Name:  Jose J Rodriguez  MRN:  [id number]    Assessment & Plan     Right wrist sprain after punching bag injury 3/9/2024.  Since initial injury patient notes significant improvement in his pain.  He only exhibits mild residual discomfort about the wrist joint.  Exhibits no significant tenderness over the scaphoid today.  Universal wrist brace provided to be worn for comfort.  Wean from brace as tolerated as pain improves.  Prescription for meloxicam.  Offered referral to Occupational Therapy.  Patient declined at this time.  Follow-up in 4 to 6 weeks with primary care sports medicine.    Chief Complaint     Right hand injury    History of the Present Illness     Jose J Rodriguez is a 28 y.o. right-hand-dominant male who reports to the office today for evaluation of his right hand.  Patient injured his right hand on 3/9/2024.  He states he was playing a boxing punching bag arcade game.  He states he punched the punching bag which resulted in severe pain in his right hand and wrist.  Initially pain was 10 out of 10 in intensity with associated swelling.  He did report to the emergency department on 3/11/2024 due to persistent pain.  At that time x-rays were performed and he was placed in a splint.  He states the splint was placed too tightly and had since removed it.  After removing the brace he did note improvement in his pain.  Since the initial injury his pain is improved significantly and currently is now 3 out of 10 in intensity.  He notes generalized discomfort about the right wrist worse with increased use and movement.  Patient works as a  and has been able to perform his duties at work without difficulty.  He currently takes nothing for pain.  He states his initial swelling has almost fully resolved.  He denies any numbness and tingling.  No weakness or instability.  No fevers or chills.    Physical Exam     /78   Wt 75.3 kg (166 lb)     Right hand/wrist: No gross deformity.  Skin  intact.  Mild soft tissue swelling over the wrist.  No erythema or ecchymosis.  There is mild generalized discomfort about the wrist joint.  No tenderness over the distal radius, distal ulna, scaphoid and remainder of the carpus.  No tenderness over the metacarpals.  Full wrist flexion extension with slight discomfort.  Full composite fist remission with slight discomfort as well.  Negative DRUJ shuck test.  Sensation intact median ulnar and radial nerves.  2+ radial pulse.    Eyes: Anicteric sclerae.  ENT: Trachea midline.  Lungs: Normal respiratory effort.  CV: Capillary refill is less than 2 seconds.  Skin: Intact without erythema.  Lymph: No palpable lymphadenopathy.  Neuro: Sensation is grossly intact to light touch.  Psych: Mood and affect are appropriate.    Data Review     I have personally reviewed pertinent films in PACS, and my interpretation follows:    X-rays right hand and wrist 3/11/2024: No acute osseous abnormality.  No fracture or dislocation.  No significant degenerative changes.    Past Medical History:   Diagnosis Date    ADHD     Murmur        Past Surgical History:   Procedure Laterality Date    NO PAST SURGERIES         Allergies   Allergen Reactions    Amoxil [Amoxicillin]     Codeine     Penicillins Hives       No current outpatient medications on file prior to visit.     No current facility-administered medications on file prior to visit.       Social History     Tobacco Use    Smoking status: Former     Current packs/day: 0.50     Types: Cigarettes    Smokeless tobacco: Never   Vaping Use    Vaping status: Every Day   Substance Use Topics    Alcohol use: No    Drug use: Yes     Types: Marijuana       History reviewed. No pertinent family history.    Review of Systems     As stated in the HPI. All other systems reviewed and are negative.

## 2025-04-05 ENCOUNTER — HOSPITAL ENCOUNTER (EMERGENCY)
Facility: HOSPITAL | Age: 30
Discharge: HOME | End: 2025-04-05
Attending: EMERGENCY MEDICINE

## 2025-04-05 VITALS
DIASTOLIC BLOOD PRESSURE: 95 MMHG | OXYGEN SATURATION: 99 % | TEMPERATURE: 98.6 F | RESPIRATION RATE: 18 BRPM | SYSTOLIC BLOOD PRESSURE: 135 MMHG | WEIGHT: 160.94 LBS | HEART RATE: 75 BPM

## 2025-04-05 DIAGNOSIS — R09.81 SINUS CONGESTION: Primary | ICD-10-CM

## 2025-04-05 DIAGNOSIS — R42 VERTIGO: ICD-10-CM

## 2025-04-05 PROCEDURE — 99284 EMERGENCY DEPT VISIT MOD MDM: CPT

## 2025-04-05 PROCEDURE — 93005 ELECTROCARDIOGRAM TRACING: CPT

## 2025-04-05 PROCEDURE — 99284 EMERGENCY DEPT VISIT MOD MDM: CPT | Performed by: EMERGENCY MEDICINE

## 2025-04-05 RX ORDER — MECLIZINE HYDROCHLORIDE 25 MG/1
25 TABLET ORAL ONCE
Status: COMPLETED | OUTPATIENT
Start: 2025-04-05 | End: 2025-04-05

## 2025-04-05 RX ORDER — MECLIZINE HYDROCHLORIDE 25 MG/1
25 TABLET ORAL 3 TIMES DAILY PRN
Qty: 30 TABLET | Refills: 0 | Status: SHIPPED | OUTPATIENT
Start: 2025-04-05

## 2025-04-05 RX ADMIN — MECLIZINE HYDROCHLORIDE 25 MG: 25 TABLET ORAL at 21:12

## 2025-04-06 LAB
ATRIAL RATE: 63 BPM
P AXIS: 71 DEGREES
PR INTERVAL: 166 MS
QRS AXIS: 70 DEGREES
QRSD INTERVAL: 104 MS
QT INTERVAL: 400 MS
QTC INTERVAL: 409 MS
T WAVE AXIS: 71 DEGREES
VENTRICULAR RATE: 63 BPM

## 2025-04-06 PROCEDURE — 93010 ELECTROCARDIOGRAM REPORT: CPT | Performed by: INTERNAL MEDICINE

## 2025-04-06 NOTE — DISCHARGE INSTRUCTIONS
"Patient Education     Vertigo (a type of dizziness)   The Basics   Written by the doctors and editors at [facility]   What are dizziness and vertigo? -- Dizziness is a feeling that is sometimes hard to describe. It often makes you feel like you are about to fall or pass out. Dizziness can also cause you to feel lightheaded or make it hard for you to walk straight.  Vertigo is a type of dizziness. It makes you feel like you are spinning, swaying, or tilting, or like the room is moving around you. Depending on the cause, these feelings can come and go, and might last seconds, hours, or days. You might feel worse when you move your head, change positions, cough, or sneeze.  Some people with vertigo have trouble walking. Others have nausea and might vomit.  What causes vertigo? -- The most common causes of vertigo include:   Inner ear problems - Deep inside the ear, there is a small network of tubes that are filled with fluid (figure 1). Floating inside that fluid are special calcium deposits. These tubes and deposits are part of the \"vestibular system.\" This system tells the brain what position the body is in and how, and if it is moving or still. It also helps keep you balanced.  Problems that affect the inner ear and can lead to vertigo include:   Benign paroxysmal positional vertigo (\"BPPV\") - In this condition, calcium deposits become dislodged from their location in the inner ear. This can lead to short episodes of vertigo that happen when you move your head in certain ways.   Meniere disease - This is a condition in which fluid builds up inside the inner ear. This causes vertigo, as well as hearing loss and ringing in 1 or both ears.   Vestibular neuritis - This is believed to be caused by a virus that can cause inflammation of the nerve in the inner ear. It is sometimes called \"labyrinthitis.\" People with this condition have vertigo that starts quickly and can last several days. They also often feel very sick and " off balance.   Head injury - Even a minor head injury can cause inner ear damage and vertigo. This is usually temporary.   Other problems - Other things that can cause vertigo include:   Vestibular migraine - People who get migraine headaches can sometimes have episodes of vertigo. This can happen with or without a headache.   Certain medicines   Problems that affect the brain, such as stroke or multiple sclerosis  Should I see a doctor or nurse? -- See your doctor or nurse right away if you have vertigo and:   Have a new or severe headache   Have a fever higher than 100.4°F (38°C)   Start to see double, or have trouble seeing clearly   Have trouble speaking or hearing   Have weakness in an arm or leg, or your face droops to 1 side   Cannot walk on your own   Pass out   Have numbness or tingling   Have chest pain   Cannot stop vomiting  You should also see your doctor or nurse if you have vertigo that lasts for several minutes or more and you:   Are older than 60   Had a stroke in the past   Are at risk for having a stroke, for example, because you have diabetes or you smoke  If you have dizziness or vertigo that comes and goes but you do not have any of the problems listed above, you should still make an appointment with your doctor or nurse.  Will I need tests? -- Maybe. Your doctor will start by learning about your symptoms and doing an exam. During the exam, they will check:   Your hearing   How you walk and keep your balance   How your eyes work when you watch a moving object, and when your head is turned from side to side  Depending on what your doctor finds during the exam, they might order more tests to better understand your hearing or balance problems. In some cases, the doctor will order an MRI of your brain. An MRI is an imaging test that creates pictures of the inside of your body.  How is vertigo treated? -- If your doctor knows what is causing your vertigo, they will probably try to treat that problem  "directly. For instance, if you have BPPV, the doctor might try moving your head in a specific way. This can move the calcium deposits that are causing your vertigo.  Your doctor can also give you medicines that might help your vertigo and relieve nausea and vomiting.  If your vertigo does not get better, your doctor might also suggest a treatment called \"balance rehabilitation.\" This treatment teaches you exercises that can help you cope with your vertigo.  Is there anything I can do on my own? -- Yes. You can:   Prevent falls - If you have trouble standing or walking because of vertigo, you are at risk of falling. To lower this risk, make your home as safe as possible. Get rid of loose electrical cords, clutter, and slippery rugs. Also, wear sturdy, non-slip shoes, and make sure that your walkways are clear and well lit.   Sit or lie down if you start to feel dizzy. If you start to feel dizzy while driving, pull over right away.   Use a cane or walker to help you balance if needed.   Try to avoid changing positions quickly. When you wake up, sit up first, then get out of bed slowly.  All topics are updated as new evidence becomes available and our peer review process is complete.  This topic retrieved from [employer] on: Mar 15, 2024.  Topic 88944 Version 8.0  Release: 32.2.4 - C32.73  © 2024 UpToDate, Inc. and/or its affiliates. All rights reserved.  figure 1: The vestibular system     Deep inside the ear, there is a small network of tubes that are filled with fluid, called the \"semicircular canals.\" Also deep inside the ear are special calcium deposits, called \"otolith organs.\" These tubes and deposits are part of the \"vestibular system.\" This system tells the brain what position the head is in and if it is moving or still. It also helps keep you balanced, especially when you are standing or walking.  Graphic 38043 Version 11.0  Consumer Information Use and Disclaimer   Disclaimer: This generalized information is a " limited summary of diagnosis, treatment, and/or medication information. It is not meant to be comprehensive and should be used as a tool to help the user understand and/or assess potential diagnostic and treatment options. It does NOT include all information about conditions, treatments, medications, side effects, or risks that may apply to a specific patient. It is not intended to be medical advice or a substitute for the medical advice, diagnosis, or treatment of a health care provider based on the health care provider's examination and assessment of a patient's specific and unique circumstances. Patients must speak with a health care provider for complete information about their health, medical questions, and treatment options, including any risks or benefits regarding use of medications. This information does not endorse any treatments or medications as safe, effective, or approved for treating a specific patient. UpToDate, Inc. and its affiliates disclaim any warranty or liability relating to this information or the use thereof.The use of this information is governed by the Terms of Use, available at https://www.wolterskluwer.com/en/know/clinical-effectiveness-terms. 2024© UpToDate, Inc. and its affiliates and/or licensors. All rights reserved.  Copyright   © 2024 UpToDate, Inc. and/or its affiliates. All rights reserved.

## 2025-04-06 NOTE — ED PROVIDER NOTES
"Time reflects when diagnosis was documented in both MDM as applicable and the Disposition within this note       Time User Action Codes Description Comment    4/5/2025  9:07 PM Sujata Hamilton Add [R09.81] Sinus congestion     4/5/2025  9:07 PM Sujata Hamilton Add [R42] Vertigo           ED Disposition       ED Disposition   Discharge    Condition   Stable    Date/Time   Sat Apr 5, 2025  9:08 PM    Comment   Jose J Rosalesstresser discharge to home/self care.                   Assessment & Plan       Medical Decision Making  This patient presents with dizziness, most consistent with a peripheral cause, likely BPPV, secondary to sinus congestion from viral etiology. No history of recent infection so doubt vestibular neuritis. History not consistent with meniere's disease. No history of trauma. No red flag features for central vertigo to include gradual onset, vertical/bidirectional or non-fatigable nystagmus, focal neurologic findings on exam (including inability to ambulate, ataxia, dysmetria). Presentation not consistent with an acute CNS infection, vertebral basilar artery insufficiency, cerebellar hemorrhage or infarction, intracranial mass or bleed. Patient treated symptomatically with antivert. Patient was educated in taking his time with head movement and using over the counter allergy medication to decrease sinus congestion. Patient is to follow up with PCP for further evaluation and management. Strict return precautions were given.     Amount and/or Complexity of Data Reviewed  ECG/medicine tests: ordered and independent interpretation performed.             Medications   meclizine (ANTIVERT) tablet 25 mg (25 mg Oral Given 4/5/25 2112)       ED Risk Strat Scores                                                History of Present Illness       Chief Complaint   Patient presents with    Dizziness     Dizziness for a week. He describes the feeling as \"room spinning when drunk\". Today had a syncopal episode where he " "was standing and fell to the floor. Unsure of head strike, + LOC. - thinners. Patient was seen at urgent care last week and they said he had fluid behind his nose and ears. He was given medication without relief.        Past Medical History:   Diagnosis Date    ADHD     Murmur       Past Surgical History:   Procedure Laterality Date    ANKLE FRACTURE SURGERY Right 10/08/2024    NO PAST SURGERIES        History reviewed. No pertinent family history.   Social History     Tobacco Use    Smoking status: Former     Current packs/day: 0.50     Types: Cigarettes    Smokeless tobacco: Never   Vaping Use    Vaping status: Every Day   Substance Use Topics    Alcohol use: No    Drug use: Yes     Types: Marijuana      E-Cigarette/Vaping    E-Cigarette Use Current Every Day User       E-Cigarette/Vaping Substances      I have reviewed and agree with the history as documented.     Previously healthy 29-year-old male presents for evaluation due to persistent dizziness.  Patient was evaluated at urgent care approximately 1 week ago and was diagnosed with \"fluid in his ears\".  Patient was told that he will have dizziness to come to the emergency department should he have any lightheadedness.  Today patient stated that he was lightheaded and was dizzy every single time to turn his head.  Patient denied any other symptoms such as chest pain, shortness of breath, nausea, vomiting or diaphoresis.  Patient's report persistent nasal congestion and rhinorrhea for the last week.  Patient was exposed to a sick contact with the flu however his COVID test was negative.  Patient also endorses seasonal allergies with nasal congestion.  Patient works as a .  Patient denies any prior episodes similar to this 1.  Patient does not have any current symptoms.  Patient denies any current drug or alcohol use.          Review of Systems   Constitutional:  Negative for chills and fever.   HENT:  Positive for congestion and rhinorrhea. Negative for " ear pain and sore throat.    Eyes:  Negative for pain and visual disturbance.   Respiratory:  Negative for cough and shortness of breath.    Cardiovascular:  Negative for chest pain and palpitations.   Gastrointestinal:  Negative for abdominal pain and vomiting.   Genitourinary:  Negative for dysuria and hematuria.   Musculoskeletal:  Negative for arthralgias and back pain.   Skin:  Negative for color change and rash.   Neurological:  Positive for dizziness and syncope. Negative for seizures.   All other systems reviewed and are negative.          Objective       ED Triage Vitals [04/05/25 2043]   Temperature Pulse Blood Pressure Respirations SpO2 Patient Position - Orthostatic VS   98.6 °F (37 °C) 82 149/86 18 96 % Sitting      Temp Source Heart Rate Source BP Location FiO2 (%) Pain Score    Oral Monitor Right arm -- No Pain      Vitals      Date and Time Temp Pulse SpO2 Resp BP Pain Score FACES Pain Rating User   04/05/25 2100 -- 75 99 % 18 135/95 -- -- CG   04/05/25 2043 98.6 °F (37 °C) 82 96 % 18 149/86 No Pain -- CFW            Physical Exam  Vitals and nursing note reviewed.   Constitutional:       General: He is not in acute distress.     Appearance: Normal appearance. He is well-developed.   HENT:      Head: Normocephalic and atraumatic.      Right Ear: Tympanic membrane, ear canal and external ear normal.      Left Ear: Tympanic membrane, ear canal and external ear normal.      Nose: Congestion present.      Mouth/Throat:      Mouth: Mucous membranes are moist.      Pharynx: Oropharynx is clear. Posterior oropharyngeal erythema present.   Eyes:      Extraocular Movements: Extraocular movements intact.      Conjunctiva/sclera: Conjunctivae normal.      Pupils: Pupils are equal, round, and reactive to light.   Cardiovascular:      Rate and Rhythm: Normal rate and regular rhythm.      Heart sounds: No murmur heard.  Pulmonary:      Effort: Pulmonary effort is normal. No respiratory distress.      Breath  sounds: Normal breath sounds.   Abdominal:      Palpations: Abdomen is soft.      Tenderness: There is no abdominal tenderness.   Musculoskeletal:         General: No swelling.      Cervical back: Neck supple.   Skin:     General: Skin is warm and dry.      Capillary Refill: Capillary refill takes less than 2 seconds.   Neurological:      General: No focal deficit present.      Mental Status: He is alert and oriented to person, place, and time.      Cranial Nerves: No cranial nerve deficit.      Motor: No weakness.      Gait: Gait normal.   Psychiatric:         Mood and Affect: Mood normal.         Results Reviewed       None            No orders to display       ECG 12 Lead Documentation Only    Date/Time: 4/5/2025 8:59 PM    Performed by: Sujata Hamilton MD  Authorized by: Sujata Hamilton MD    ECG reviewed by me, the ED Provider: yes    Patient location:  ED  Previous ECG:     Previous ECG:  Compared to current    Similarity:  No change  Interpretation:     Interpretation: abnormal    Rate:     ECG rate:  63    ECG rate assessment: normal    Rhythm:     Rhythm: sinus rhythm    Ectopy:     Ectopy: none    QRS:     QRS axis:  Normal  Conduction:     Conduction: abnormal      Abnormal conduction: incomplete RBBB    ST segments:     ST segments:  Normal  T waves:     T waves: normal        ED Medication and Procedure Management   Prior to Admission Medications   Prescriptions Last Dose Informant Patient Reported? Taking?   meloxicam (Mobic) 15 mg tablet   No No   Sig: Take 1 tablet (15 mg total) by mouth daily      Facility-Administered Medications: None     Discharge Medication List as of 4/5/2025  9:09 PM        START taking these medications    Details   meclizine (ANTIVERT) 25 mg tablet Take 1 tablet (25 mg total) by mouth 3 (three) times a day as needed for dizziness, Starting Sat 4/5/2025, Normal           CONTINUE these medications which have NOT CHANGED    Details   meloxicam (Mobic) 15 mg  tablet Take 1 tablet (15 mg total) by mouth daily, Starting Fri 3/15/2024, Normal           No discharge procedures on file.  ED SEPSIS DOCUMENTATION   Time reflects when diagnosis was documented in both MDM as applicable and the Disposition within this note       Time User Action Codes Description Comment    4/5/2025  9:07 PM Sujata Hamilton [R09.81] Sinus congestion     4/5/2025  9:07 PM Sujata Hamilton [R42] Vertigo                  Sujata Hamilton MD  04/05/25 7413